# Patient Record
Sex: FEMALE | Race: WHITE | NOT HISPANIC OR LATINO | URBAN - METROPOLITAN AREA
[De-identification: names, ages, dates, MRNs, and addresses within clinical notes are randomized per-mention and may not be internally consistent; named-entity substitution may affect disease eponyms.]

---

## 2017-10-11 RX ORDER — PRAVASTATIN SODIUM 10 MG/1
TABLET ORAL
Qty: 90 TABLET | Refills: 4 | OUTPATIENT
Start: 2017-10-11

## 2018-01-30 ENCOUNTER — HISTORICAL (OUTPATIENT)
Dept: ADMINISTRATIVE | Facility: HOSPITAL | Age: 78
End: 2018-01-30

## 2022-04-10 ENCOUNTER — HISTORICAL (OUTPATIENT)
Dept: ADMINISTRATIVE | Facility: HOSPITAL | Age: 82
End: 2022-04-10

## 2022-04-29 VITALS
SYSTOLIC BLOOD PRESSURE: 108 MMHG | WEIGHT: 202 LBS | HEIGHT: 63 IN | DIASTOLIC BLOOD PRESSURE: 78 MMHG | BODY MASS INDEX: 35.79 KG/M2

## 2022-05-04 NOTE — HISTORICAL [facility] CERNER
This is a historical note converted from [employer]. Formatting and pictures may have been removed.  Please reference [facility] for original formatting and attached multimedia. Chief Complaint  NEW PATIENT HERE FOR PETER KNEE PAIN. PATIENT HAS HAD THE SYNVISC INJECTION IN PETER KNEES WITH DR MILLAN  History of Present Illness  Knee symptoms? are better with euflexa ?::knee gives way ?? ?Knee joint pain on the left ?? ?Worse with weightbearing ?? ?Worse in the morning  ?? ?Slowly worsens with extended activity ?? ?Is increased by bending it ?? ?By twisting it ?? ?By kneeling ?? ?With stairs ?? ?By squatting  ?? ?Knee joint swelling on the left? ?posteriorly ?? ?Medially ?? ?Laterally? ?  ? ?Knee joint pain not improved by rest ?? ?Not by ice? ?? ?No clicking sensation in the left knee ???? grating sensation in the left knee?  ?? ?The knee did not suddenly buckle due to contact ???? popping sound was heard in the knee?  ?? ?No tingling ?? ?No burning sensation  Review of Systems  Review of Systems  ????Constitutional: No fever, No chills.  ????Respiratory: No shortness of breath, No cough.  ????Cardiovascular: No chest pain.  ????Gastrointestinal: No nausea, No vomiting, No diarrhea, No constipation, No heartburn.  ????Genitourinary: No dysuria, No hematuria.  ????Hematology/Lymphatics: No bleeding tendency.  ????Endocrine: No polyuria.  ????Neurologic: Alert and oriented X4, No numbness, No tingling.  ????Psychiatric: No anxiety, No depression.  Physical Exam  Vitals & Measurements  BP:?108/78?  HT:?160?cm? HT:?160?cm? WT:?91.62?kg? WT:?91.62?kg? BMI:?35.79?  PHYSICAL FINDINGS  Cardiovascular:  ? ?? Arterial Pulses: ? Dorsalis pedis pulses were normal right.? ? Dorsalis pedis pulses were normal left.  Musculoskeletal System:  ? ?? Hips:  ? ?? ?? ? General/bilateral: ? No swelling of the hips.? ? No induration of the hips.  ? ?? ?? ? Right Hip: ? Motion was normal.? ? No pain was elicited by active internal rotation with the hip  flexed.  ? ?? ?? ? ? No pain was elicited by active external rotation with the hip flexed.? ? No pain was elicited by active motion.? ? No pain was elicited by passive motion.  ? ?? ?? ? Left Hip: ? Motion was normal.  ? ?? Left Knee: ? Examined.? ??no ?effusion.? ?Localized swelling.? ?Genu varum.? ?Patella demonstrated crepitus.? ?Anteromedial aspect was tender on palpation.? ?Medial aspect was tender on palpation.  patella  Left Knee:  Knee Motion:? ?? ?? Value???????????  ? Active flexion? ?? ?? [130] degrees  Active extension? ? [05] degrees  ?   left knee ?? Pain was elicited throughout the range of motion.? ? Swelling with a negative fluctuation test.? ? No erythema.? ? No warmth.? ? Anterior aspect was? tender on palpation.? ? Patellofemoral region was? tender on palpation.? ? Medial collateral ligament was not tender on palpation.? ? Lateral collateral ligament was not tender on palpation.? ? No pain was elicited by motion using [place] apprehension test.? ? No laxity of the posterior cruciate ligament.? ? No anterior drawer sign was present.? ? No one plane medial (straight) instability.? ? No one plane lateral (straight) instability.? ? A Lachman test did not demonstrate one plane anterior instability.? ? Clarkes sign was? observed for chondromalacia.  ?  ?   Foot:  ? ?? Left Foot: ? No excessive pronation.? ? No excessive supination.  Functional Exam:  ? ?? General/bilateral: ? Ambulation did not require a cane.? ? Ambulation did not require a walker.  Neurological:  ? ?? Sensation: ? No sensory exam abnormalities were noted.  ? ?? Motor: ? Strength was normal.? ? No weakness of the right hip was observed.? ? No weakness of the left hip was observed.  ? ?? Gait and Stance: ? A left-sided antalgic gait was observed.  Skin:  Injury / Incision Site: ? Left knee showed no tissue injury scar.  ?  ?   TESTS  Imaging:  X-Ray Knee:  AP and lateral view x-rays of the left knee with sunrise view of the patella  were performed -of left knee.  ?   IMPRESSIONS RADIOLOGY TEST  Narrowing of the left knee joint space bone on bone, showed sclerosis of the left knee, and osteophytes arising from the left knee.  This patient has significant osteoarthritis in both knees but the left is symptomatic she has a varus deformity and?did well with a Euflexxa injection so we may consider repeating that if she is cleared by her?cardiac? and oncologist  Assessment/Plan  1.?Primary osteoarthritis of left knee  2.?Osteoarthritis of right knee  Left knee pain  Right knee pain   Problem List/Past Medical History  Ongoing  Cancer  Diabetes mellitus  Hypertension  Lymphoma  Osteoarthritis of right knee  Primary osteoarthritis of left knee  Spinal stenosis  Historical  Procedure/Surgical History  Gallbladder  Hernia repair  Hysterectomy  Lumpectomy  Lymph node  Stent  Tonsillectomy  Medications  allopurinol 300 mg oral tablet, 150 mg= 0.5 tab(s), Oral, Daily  amitriptyline 10 mg oral tablet, 20 mg= 2 tab(s), Oral, Once a day (at bedtime)  DECARA 46796 IU D3 (KAREN) CAPSULES, Oral  FUROSEMIDE 40MG TABLETS, 40 mg= 1 tab(s), Oral, Daily  gabapentin 100 mg oral capsule, 200 mg= 2 cap(s), Oral, BID  JANUMET 50/1000MG TABLETS, 1 tab(s), Oral, BID  Klor-Con M20 oral tablet, extended release, 20 mEq= 1 tab(s), Oral, BID  Lantus 100 units/mL subcutaneous inj., 25 units, Subcutaneous  LEVOTHYROXINE 0.112MG (112MCG) TABS, 112 mcg= 1 tab(s), Oral, Daily  METOPROLOL ER SUCCINATE 50MG TABS, 50 mg= 1 tab(s), Oral, qPM  Mobic 7.5 mg oral tablet, 7.5 mg= 1 tab(s), Oral, Daily  Pravastatin 80 mg Oral Tab, 80 mg= 1 tab(s), Oral, Daily  valsartan 320 mg oral tablet, 320 mg= 1 tab(s), Oral, Daily  Allergies  EPINEPHrine?(Chest tightness)  morphine?(Upset stomach)  penicillin?(Rash)  Social History  Alcohol - 01/30/2018  Never  Substance Abuse - 01/30/2018  Never  Tobacco - 01/30/2018  Former smoker  Family History  Family history is unknown